# Patient Record
Sex: MALE | Race: WHITE | URBAN - METROPOLITAN AREA
[De-identification: names, ages, dates, MRNs, and addresses within clinical notes are randomized per-mention and may not be internally consistent; named-entity substitution may affect disease eponyms.]

---

## 2023-03-13 ENCOUNTER — APPOINTMENT (RX ONLY)
Dept: URBAN - METROPOLITAN AREA CLINIC 335 | Facility: CLINIC | Age: 73
Setting detail: DERMATOLOGY
End: 2023-03-13

## 2023-03-13 DIAGNOSIS — L82.1 OTHER SEBORRHEIC KERATOSIS: ICD-10-CM | Status: INADEQUATELY CONTROLLED

## 2023-03-13 PROBLEM — D48.5 NEOPLASM OF UNCERTAIN BEHAVIOR OF SKIN: Status: ACTIVE | Noted: 2023-03-13

## 2023-03-13 PROCEDURE — ? COUNSELING

## 2023-03-13 PROCEDURE — 99202 OFFICE O/P NEW SF 15 MIN: CPT | Mod: 25

## 2023-03-13 PROCEDURE — 11104 PUNCH BX SKIN SINGLE LESION: CPT

## 2023-03-13 PROCEDURE — ? BIOPSY BY PUNCH METHOD

## 2023-03-13 ASSESSMENT — LOCATION SIMPLE DESCRIPTION DERM: LOCATION SIMPLE: LEFT PRETIBIAL REGION

## 2023-03-13 ASSESSMENT — LOCATION DETAILED DESCRIPTION DERM: LOCATION DETAILED: LEFT MEDIAL PROXIMAL PRETIBIAL REGION

## 2023-03-13 ASSESSMENT — LOCATION ZONE DERM: LOCATION ZONE: LEG

## 2023-03-27 ENCOUNTER — APPOINTMENT (RX ONLY)
Dept: URBAN - METROPOLITAN AREA CLINIC 335 | Facility: CLINIC | Age: 73
Setting detail: DERMATOLOGY
End: 2023-03-27

## 2023-03-27 PROBLEM — C44.319 BASAL CELL CARCINOMA OF SKIN OF OTHER PARTS OF FACE: Status: ACTIVE | Noted: 2023-03-27

## 2023-03-27 PROCEDURE — ? PATIENT EDUCATION

## 2023-03-27 PROCEDURE — 99212 OFFICE O/P EST SF 10 MIN: CPT

## 2023-03-27 PROCEDURE — ? COUNSELING

## 2023-03-27 PROCEDURE — ? SURGICAL DECISION MAKING

## 2023-03-27 PROCEDURE — ? PATHOLOGY DISCUSSION

## 2023-03-27 NOTE — PROCEDURE: PATIENT EDUCATION
Mohs Counseling: Mohs Pre-operative Information\\n\\nThe providers at [facility] have recommended that you have Mohs Micrographic Surgery to treat your skin cancer. Mohs is a surgical procedure that is the most effective technique available for treating skin cancers. Performed in an outpatient setting, Mohs surgery entirely removes skin cancer while sparing the greatest possible amount of surrounding healthy skin.\\n\\Gm order to facilitate your healing, and a comfortable operative experience, please consider these recommendations:\\n\\nBLOOD THINNING MEDICATIONS:\\n\\nAvoid medications that prolong bleeding unless they are being specifically used for their blood thinning properties as recommended by your physician.\\n\\nSome medications or supplements that thin the blood that you may not know about are:\\n\\n* Aspirin (Ecotrin, Ascriptin, Fiorinal), Cafergot\\n\\n* Ibuprofen (Motrin, Advil, Nuprin)\\n\\n* Naproxen (Naprosyn, Aleve)\\n\\n* Fish oil\\n\\n* Vitamin E\\n\\n* Flaxseed oil\\n\\nIf you can use Tylenol for a headache instead of Aspirin, please do so. However, if your medical doctor has advised an Aspirin a day for its ability to prevent clots because you have a higher risk of coronary disease or stroke, please continue to take it.\\n\\nIf your doctor has prescribed other blood thinning medications to decrease your risk of stroke or blood clots, such as Coumadin (warfarin), Xarelto (rivaroxaban), Pradaxa (dabigatran), Eliquis (apixiban), and Plavix (clopidogrel), please continue to take these. We can work around the bleeding and the benefits outweigh the nuisance of extra bleeding during surgery.\\n\\nARTIFICIAL HEART VALVES AND ARTIFICIAL JOINTS:\\n\\nTo help our providers decide if you need antibiotics before your surgery, please let your provider know if:\\n\\n* You have valvular heart disease, or have had a heart valve replacement, and generally require antibiotics before surgery or dental procedures\\n\\n* You have had hip, knee, or other joint replacements within the past two years, or if your doctor has recommended that you take antibiotics before surgery or dental procedures\\n\\nON THE DAY OF YOUR PROCEDURE:\\n\\n**Please take all your normally prescribed medications as directed by your physicians.\\n\\n**Please be sure to eat your normal breakfast and/or lunch before coming in for surgery so that you are feeling well.\\n\\n*You may bring one  along.\\n\\n*Our Mohs suites tend to be a little chilly; wearing warm clothing is ideal.\\n\\nPRIOR TO YOUR SURGERY:\\n\\n** Before your surgery, your referring provider may prescribe you antibiotics to take and or apply post the surgery. Please take 1 hour prior to surgery time.\\n\\nWHAT TO EXPECT ON THE DAY OF YOUR PROCEDURE:\\n\\nThe affected area will first be anesthetized (numbed) with local anesthesia. Dr. Walker will then remove a thin layer of the cancerous tissue. This part of the procedure takes 5-10 minutes.\\n\\nThe tissue is then specially processed, stained with dyes, and placed on microscope slides for Dr. Walker to review. This part of the process can take as little as 30 minutes, or as long as 2 hours, depending on the specific nature of the tissue removed and the type of cancer being treated.\\n\\nIf Dr. Walker sees that there are any roots or tails of the cancer left under the microscope, he will come back and remove more tissue from just those areas, and the process is repeated in stages until all the cancer is removed.\\n\\nMohs surgery is most often completed in one office visit. Most patients experience little discomfort during and after the Mohs procedure.\\n\\nFollowing the Mohs surgery procedure, Dr. Walker will repair the majority of wounds with sutures at that time, or by allowing the wound to heal naturally. Occasionally, the involvement of another specialist may be needed to assist in the reconstruction of your wound. Dr. Walker and his team will discuss this with you if it is needed.\\n\\nThe Mohs surgery procedure is done in stages, and there is no reliable way to tell in advance how many stages your cancer will need in order to be removed. It is therefore best to plan on being at our office anywhere from 2 hours to all day on the day of your procedure. You may bring lunch if you wish. Our [facility] office is located at [address] [place] [address]. If you have any additional questions, please feel free to contact our office at [phone]. We look forward to serving you soon! Mohs Counseling: Mohs Pre-operative Information\\n\\nThe providers at [facility] have recommended that you have Mohs Micrographic Surgery to treat your skin cancer. Mohs is a surgical procedure that is the most effective technique available for treating skin cancers. Performed in an outpatient setting, Mohs surgery entirely removes skin cancer while sparing the greatest possible amount of surrounding healthy skin.\\n\\Gm order to facilitate your healing, and a comfortable operative experience, please consider these recommendations:\\n\\nBLOOD THINNING MEDICATIONS:\\n\\nAvoid medications that prolong bleeding unless they are being specifically used for their blood thinning properties as recommended by your physician.\\n\\nSome medications or supplements that thin the blood that you may not know about are:\\n\\n* Aspirin (Ecotrin, Ascriptin, Fiorinal), Cafergot\\n\\n* Ibuprofen (Motrin, Advil, Nuprin)\\n\\n* Naproxen (Naprosyn, Aleve)\\n\\n* Fish oil\\n\\n* Vitamin E\\n\\n* Flaxseed oil\\n\\nIf you can use Tylenol for a headache instead of Aspirin, please do so. However, if your medical doctor has advised an Aspirin a day for its ability to prevent clots because you have a higher risk of coronary disease or stroke, please continue to take it.\\n\\nIf your doctor has prescribed other blood thinning medications to decrease your risk of stroke or blood clots, such as Coumadin (warfarin), Xarelto (rivaroxaban), Pradaxa (dabigatran), Eliquis (apixiban), and Plavix (clopidogrel), please continue to take these. We can work around the bleeding and the benefits outweigh the nuisance of extra bleeding during surgery.\\n\\nARTIFICIAL HEART VALVES AND ARTIFICIAL JOINTS:\\n\\nTo help our providers decide if you need antibiotics before your surgery, please let your provider know if:\\n\\n* You have valvular heart disease, or have had a heart valve replacement, and generally require antibiotics before surgery or dental procedures\\n\\n* You have had hip, knee, or other joint replacements within the past two years, or if your doctor has recommended that you take antibiotics before surgery or dental procedures\\n\\nON THE DAY OF YOUR PROCEDURE:\\n\\n**Please take all your normally prescribed medications as directed by your physicians.\\n\\n**Please be sure to eat your normal breakfast and/or lunch before coming in for surgery so that you are feeling well.\\n\\n*You may bring one  along.\\n\\n*Our Mohs suites tend to be a little chilly; wearing warm clothing is ideal.\\n\\nPRIOR TO YOUR SURGERY:\\n\\n** Before your surgery, your referring provider may prescribe you antibiotics to take and or apply post the surgery. Please take 1 hour prior to surgery time.\\n\\nWHAT TO EXPECT ON THE DAY OF YOUR PROCEDURE:\\n\\nThe affected area will first be anesthetized (numbed) with local anesthesia. Dr. Walker will then remove a thin layer of the cancerous tissue. This part of the procedure takes 5-10 minutes.\\n\\nThe tissue is then specially processed, stained with dyes, and placed on microscope slides for Dr. Walker to review. This part of the process can take as little as 30 minutes, or as long as 2 hours, depending on the specific nature of the tissue removed and the type of cancer being treated.\\n\\nIf Dr. Walker sees that there are any roots or tails of the cancer left under the microscope, he will come back and remove more tissue from just those areas, and the process is repeated in stages until all the cancer is removed.\\n\\nMohs surgery is most often completed in one office visit. Most patients experience little discomfort during and after the Mohs procedure.\\n\\nFollowing the Mohs surgery procedure, Dr. Walker will repair the majority of wounds with sutures at that time, or by allowing the wound to heal naturally. Occasionally, the involvement of another specialist may be needed to assist in the reconstruction of your wound. Dr. Walker and his team will discuss this with you if it is needed.\\n\\nThe Mohs surgery procedure is done in stages, and there is no reliable way to tell in advance how many stages your cancer will need in order to be removed. It is therefore best to plan on being at our office anywhere from 2 hours to all day on the day of your procedure. You may bring lunch if you wish. Our [facility] office is located at [address] [place] [address]. If you have any additional questions, please feel free to contact our office at [phone]. We look forward to serving you soon!

## 2023-03-27 NOTE — PROCEDURE: PATIENT EDUCATION
[facility]erm Counseling: Filler Information \\n\\nCosmetic Filler Information\\nFillers are a smooth gel consisting of hyaluronic acid.  Hyaluronic acid is a substance that naturally occurs in our bodies, unlike collagen, no pretesting is needed with these products.  The provider eases the injectable under the patient's skin to instantly smooth out wrinkles or folds.  The filler our providers inject is Juv?derm Ultra,  and Juv?derm Voluma.\\n\\n\\nCosmetic Filler Consultation\\nPatient cannot have a history of any autoimmune conditions such as lupus.\\nPatient cannot be pregnant or breastfeeding.\\nSide effects include bruising, swelling, and pain at site (all side effects are temporary and generally last around 7 days). \\nThe area will be numbed with Betacaine or lidocaine.\\nTreatment generally takes 1-2 full vials per area. \\nAreas treated are nasal labial folds, around the mouth, lips, marionette lines, and hands.\\nPatient may consider Botox in addition to Filler.\\nFillers and Botox treatment can be done at the same time.\\nIf bruising is a concern, Arnica is a supplement that minimizes bruising.  This supplement can be taken once daily for 1 week prior to your filler appointment to prevent bruising.  It can also be taken starting on the same day as the procedure to minimize bruising and quicken the healing process if bruises do appear.  If starting on the same day as the procedure please follow instructions on the package. \\n\\nTo help your filler last longer we recommend using a product called HA 5 twice a day. It stimulates our body's  natural production of Hyaluronic Acid which is the main ingredient in your filler. HA5 can be used under your moisturizer. For your convenience this product is available for purchase in our office. Please ask our staff for more details. \\nPlease call us at [phone] with any questions or concerns.\\n\\n**Become a [employer] Member of [facility] and receive great discounts on the cosmetic services you already enjoy! Please ask our staff for more details.**\\n**Ask our staff how you can  enhance your Cosmetic Filler. Juvederm Counseling: Filler Information \\n\\nCosmetic Filler Information\\nFillers are a smooth gel consisting of hyaluronic acid.  Hyaluronic acid is a substance that naturally occurs in our bodies, unlike collagen, no pretesting is needed with these products.  The provider eases the injectable under the patient's skin to instantly smooth out wrinkles or folds.  The filler our providers inject is Juv?derm Ultra,  and Juv?derm Voluma.\\n\\n\\nCosmetic Filler Consultation\\nPatient cannot have a history of any autoimmune conditions such as lupus.\\nPatient cannot be pregnant or breastfeeding.\\nSide effects include bruising, swelling, and pain at site (all side effects are temporary and generally last around 7 days). \\nThe area will be numbed with Betacaine or lidocaine.\\nTreatment generally takes 1-2 full vials per area. \\nAreas treated are nasal labial folds, around the mouth, lips, marionette lines, and hands.\\nPatient may consider Botox in addition to Filler.\\nFillers and Botox treatment can be done at the same time.\\nIf bruising is a concern, Arnica is a supplement that minimizes bruising.  This supplement can be taken once daily for 1 week prior to your filler appointment to prevent bruising.  It can also be taken starting on the same day as the procedure to minimize bruising and quicken the healing process if bruises do appear.  If starting on the same day as the procedure please follow instructions on the package. \\n\\nTo help your filler last longer we recommend using a product called HA 5 twice a day. It stimulates our body's  natural production of Hyaluronic Acid which is the main ingredient in your filler. HA5 can be used under your moisturizer. For your convenience this product is available for purchase in our office. Please ask our staff for more details. \\nPlease call us at [phone] with any questions or concerns.\\n\\n**Become a [employer] Member of [facility] and receive great discounts on the cosmetic services you already enjoy! Please ask our staff for more details.**\\n**Ask our staff how you can  enhance your Cosmetic Filler.

## 2023-03-27 NOTE — PROCEDURE: PATIENT EDUCATION
Mohs Counseling: MOHS/ SURGERY POST-OPERATIVE INSTRUCTIONS\\n\\nWOUND CARE\\n\\nKeep the bandage dry until 24 hours after surgery. Thereafter, change the bandage twice a day until sutures are removed. You may soak the bandage to help it peel off. Gently clean the wound with a mild soap and water, and then gently pat the wound dry. Gently apply a thick layer of Vaseline to the wound, or Mupirocin if it has been prescribed to you. Cover the wound with a Band-Aid or non-stick gauze (e.g. Telfa?), and paper tape. Repeat this process daily until sutures are removed.\\n\\nWe do not recommend using over-the-counter antibiotic ointment given the high chance of developing allergic reactions in covered surgical wounds. Do not apply alcohol or hydrogen peroxide directly to the wound.\\n\\nFor hands, wrists, and forearms:\\n\\nAfter surgery, you will be in a bulky dressing (bandage) with a velcro splint that goes from the hand to the middle of the forearm, with the fingers free. The splint is similar to a cast, however; the purpose of this splint is to decrease the mobility of your hand to prevent over working incision site. The splint protects the incision and the surgical repair, as well as lessen swelling. The splint can be removed and must be kept dry. When showering or bathing, remove bandage and the splint and refer to post op instructions for wound care. Elevate your hand above your heart as much as possible to lessen swelling and pain. Pillows and blankets under the arm are helpful when you go to sleep.\\n\\nBLEEDING\\n\\nIt is fairly common for the incision to ooze or bleed, especially in the first several hours after surgery. This can be controlled by removing the bandage we applied and then applying constant, direct pressure to the bleeding site with a clean bandage or paper towel for 20 minutes (without peeking). If the bleeding continues, repeat the above procedure for an additional 20 minutes. If the incision continues to bleed after this time, call the office at [phone].\\n\\nDISCOMFORT\\n\\nIf you have discomfort following surgery, take two Extra Strength Tylenol? (total of 1,000 mg) every 6 hours OR a prescription pain medication if one has been prescribed to you. If this is not sufficient to control the pain, please call the office. AVOID medications that contain aspirin, ibuprofen, or naproxyn (e.g. Alleve?, Excedrin?, Bufferin?), as these products increase the risk of bleeding.\\n\\nSWELLING\\n\\nLocal swelling is common after surgery. Apply an ice pack over the dressing ? on for 20 minutes and off for 1 hour.\\n\\nRepeat until bedtime. You may continue this, if necessary, as long as the swelling persists. This will help with swelling, pain, and bleeding.\\n\\nACTIVITY\\n\\nPlease refrain from any strenuous physical activity until your sutures have been removed. This includes lifting weights, going to the gym, or doing any type of stretching in the area the surgery was done. Any of these activities could cause your sutures to break and open your wound.\\n\\nALCOHOL\\n\\nAvoid drinking alcohol for 48 hours following surgery, as alcohol increases the risk of bleeding.\\n\\nSMOKING\\n\\nTo promote better healing and reduce the chance of complications, it is STRONGLY RECOMMENDED THAT YOU REFRAIN FROM SMOKING until the sutures are removed.\\n\\nSHOWERING\\n\\nUnless instructed otherwise, you may resume showering 24 hours after surgery.\\n\\nSUTURE REMOVAL\\n\\nWhen wounds are sutured, there are generally two layers of stitches placed. There are invisible stitches under the skin and visible ones above the skin. Stitches above the skin are removed in 1-2 weeks as instructed. Stitches under the skin absorb on their own in 8 weeks to 6 months depending on the type of material used. Occasionally, one of the stitches under the skin may work itself through the skin before being absorbed. If this occurs, call the office so we can remove this ?spitting? deep suture.\\n\\nFOLLOW-UP\\n\\nIt is imperative that you have routine follow-up with your dermatologist for skin checks. This should be arranged through your dermatologist?s office.\\n\\nIf Home Health was recommended for your wound, you may contact them within 24 hours if you have not heard from them contact [facility] [phone].\\n\\nIf you have any questions or concerns regarding your surgery, please call the office at [phone]. Mohs Counseling: MOHS/ SURGERY POST-OPERATIVE INSTRUCTIONS\\n\\nWOUND CARE\\n\\nKeep the bandage dry until 24 hours after surgery. Thereafter, change the bandage twice a day until sutures are removed. You may soak the bandage to help it peel off. Gently clean the wound with a mild soap and water, and then gently pat the wound dry. Gently apply a thick layer of Vaseline to the wound, or Mupirocin if it has been prescribed to you. Cover the wound with a Band-Aid or non-stick gauze (e.g. Telfa?), and paper tape. Repeat this process daily until sutures are removed.\\n\\nWe do not recommend using over-the-counter antibiotic ointment given the high chance of developing allergic reactions in covered surgical wounds. Do not apply alcohol or hydrogen peroxide directly to the wound.\\n\\nFor hands, wrists, and forearms:\\n\\nAfter surgery, you will be in a bulky dressing (bandage) with a velcro splint that goes from the hand to the middle of the forearm, with the fingers free. The splint is similar to a cast, however; the purpose of this splint is to decrease the mobility of your hand to prevent over working incision site. The splint protects the incision and the surgical repair, as well as lessen swelling. The splint can be removed and must be kept dry. When showering or bathing, remove bandage and the splint and refer to post op instructions for wound care. Elevate your hand above your heart as much as possible to lessen swelling and pain. Pillows and blankets under the arm are helpful when you go to sleep.\\n\\nBLEEDING\\n\\nIt is fairly common for the incision to ooze or bleed, especially in the first several hours after surgery. This can be controlled by removing the bandage we applied and then applying constant, direct pressure to the bleeding site with a clean bandage or paper towel for 20 minutes (without peeking). If the bleeding continues, repeat the above procedure for an additional 20 minutes. If the incision continues to bleed after this time, call the office at [phone].\\n\\nDISCOMFORT\\n\\nIf you have discomfort following surgery, take two Extra Strength Tylenol? (total of 1,000 mg) every 6 hours OR a prescription pain medication if one has been prescribed to you. If this is not sufficient to control the pain, please call the office. AVOID medications that contain aspirin, ibuprofen, or naproxyn (e.g. Alleve?, Excedrin?, Bufferin?), as these products increase the risk of bleeding.\\n\\nSWELLING\\n\\nLocal swelling is common after surgery. Apply an ice pack over the dressing ? on for 20 minutes and off for 1 hour.\\n\\nRepeat until bedtime. You may continue this, if necessary, as long as the swelling persists. This will help with swelling, pain, and bleeding.\\n\\nACTIVITY\\n\\nPlease refrain from any strenuous physical activity until your sutures have been removed. This includes lifting weights, going to the gym, or doing any type of stretching in the area the surgery was done. Any of these activities could cause your sutures to break and open your wound.\\n\\nALCOHOL\\n\\nAvoid drinking alcohol for 48 hours following surgery, as alcohol increases the risk of bleeding.\\n\\nSMOKING\\n\\nTo promote better healing and reduce the chance of complications, it is STRONGLY RECOMMENDED THAT YOU REFRAIN FROM SMOKING until the sutures are removed.\\n\\nSHOWERING\\n\\nUnless instructed otherwise, you may resume showering 24 hours after surgery.\\n\\nSUTURE REMOVAL\\n\\nWhen wounds are sutured, there are generally two layers of stitches placed. There are invisible stitches under the skin and visible ones above the skin. Stitches above the skin are removed in 1-2 weeks as instructed. Stitches under the skin absorb on their own in 8 weeks to 6 months depending on the type of material used. Occasionally, one of the stitches under the skin may work itself through the skin before being absorbed. If this occurs, call the office so we can remove this ?spitting? deep suture.\\n\\nFOLLOW-UP\\n\\nIt is imperative that you have routine follow-up with your dermatologist for skin checks. This should be arranged through your dermatologist?s office.\\n\\nIf Home Health was recommended for your wound, you may contact them within 24 hours if you have not heard from them contact [facility] [phone].\\n\\nIf you have any questions or concerns regarding your surgery, please call the office at [phone].

## 2023-03-27 NOTE — PROCEDURE: PATIENT EDUCATION
Botox Counseling: Botox Information \\n\\nBotox is a therapeutic muscle-relaxing agent consisting of a natural purified protein that is produced by the bacteria Clostridium Botulinum.  Botox is a quick and easy procedure done in the office and it takes approximately 10 minutes. This treatment relaxes the muscles and as a result the skin appears smoother, younger, relaxed, and rested.  Botox is also used for excessive sweating under the arms and hands.  It works by blocking the chemical receptors in nerves that stimulate sweat glands and isolates areas of excessive sweating.\\n\\nBotox Consultation\\n\\nPatient must not be pregnant or breastfeeding.\\nIt takes two weeks to get the full effect. We will have you back in two to three weeks for a free touch up if needed.\\nNumbing cream is used for minimal pain.\\nTreatment lasts three to six months on average.\\nCommonly treated areas are forehead, glabella, around the eyes, chest, neck, upper lip, around the lips, and axillae.\\nPatients must not have a history of seizures or neurological problems.\\nSide effects include bruising, drooping of eyelids (all side effects are temporary and would last around three weeks at the most).\\n\\nIf bruising is a concern, Arnica is a supplement that minimizes bruising.  This supplement can be taken once daily for 1 week prior to your Botox appointment to prevent bruising.  It can also be taken starting on the same day as the procedure to minimize bruising and quicken the healing process if bruises do appear.  If starting on the same day as the procedure please follow instructions on the package.  \\n\\n**Become a [employer] Member of [facility] and receive great discounts on the cosmetic services you already enjoy! Please ask our staff for more details.**\\n**Ask our staff how you can maxzimize your Botox Treatments.\\n\\nPlease call us at [phone] with questions or concerns.\\n \\n\\nPlease keep in mind that you have 3 weeks from your procedure day to schedule your free touch up from this procedure.  After that date, additional charges may apply. Botox Counseling: Botox Information \\n\\nBotox is a therapeutic muscle-relaxing agent consisting of a natural purified protein that is produced by the bacteria Clostridium Botulinum.  Botox is a quick and easy procedure done in the office and it takes approximately 10 minutes. This treatment relaxes the muscles and as a result the skin appears smoother, younger, relaxed, and rested.  Botox is also used for excessive sweating under the arms and hands.  It works by blocking the chemical receptors in nerves that stimulate sweat glands and isolates areas of excessive sweating.\\n\\nBotox Consultation\\n\\nPatient must not be pregnant or breastfeeding.\\nIt takes two weeks to get the full effect. We will have you back in two to three weeks for a free touch up if needed.\\nNumbing cream is used for minimal pain.\\nTreatment lasts three to six months on average.\\nCommonly treated areas are forehead, glabella, around the eyes, chest, neck, upper lip, around the lips, and axillae.\\nPatients must not have a history of seizures or neurological problems.\\nSide effects include bruising, drooping of eyelids (all side effects are temporary and would last around three weeks at the most).\\n\\nIf bruising is a concern, Arnica is a supplement that minimizes bruising.  This supplement can be taken once daily for 1 week prior to your Botox appointment to prevent bruising.  It can also be taken starting on the same day as the procedure to minimize bruising and quicken the healing process if bruises do appear.  If starting on the same day as the procedure please follow instructions on the package.  \\n\\n**Become a [employer] Member of [facility] and receive great discounts on the cosmetic services you already enjoy! Please ask our staff for more details.**\\n**Ask our staff how you can maxzimize your Botox Treatments.\\n\\nPlease call us at [phone] with questions or concerns.\\n \\n\\nPlease keep in mind that you have 3 weeks from your procedure day to schedule your free touch up from this procedure.  After that date, additional charges may apply.

## 2023-04-17 ENCOUNTER — APPOINTMENT (RX ONLY)
Dept: URBAN - METROPOLITAN AREA CLINIC 331 | Facility: CLINIC | Age: 73
Setting detail: DERMATOLOGY
End: 2023-04-17

## 2023-04-17 PROBLEM — C44.1192 BASAL CELL CARCINOMA OF SKIN OF LEFT LOWER EYELID, INCLUDING CANTHUS: Status: ACTIVE | Noted: 2023-04-17

## 2023-04-17 PROCEDURE — 17311 MOHS 1 STAGE H/N/HF/G: CPT

## 2023-04-17 PROCEDURE — ? PRESCRIPTION

## 2023-04-17 PROCEDURE — 13152 CMPLX RPR E/N/E/L 2.6-7.5 CM: CPT

## 2023-04-17 PROCEDURE — ? MOHS SURGERY

## 2023-04-17 RX ORDER — MUPIROCIN 20 MG/G
OINTMENT TOPICAL BID
Qty: 22 | Refills: 1 | Status: ERX | COMMUNITY
Start: 2023-04-17

## 2023-04-17 RX ORDER — MINOCYCLINE HYDROCHLORIDE 100 MG/1
CAPSULE ORAL BID
Qty: 14 | Refills: 0 | Status: ERX | COMMUNITY
Start: 2023-04-17

## 2023-04-17 RX ORDER — MINOCYCLINE HYDROCHLORIDE 100 MG/1
CAPSULE ORAL BID
Qty: 14 | Refills: 0 | Status: CANCELLED | COMMUNITY
Start: 2023-04-17

## 2023-04-17 RX ORDER — MUPIROCIN 20 MG/G
OINTMENT TOPICAL BID
Qty: 22 | Refills: 1 | Status: CANCELLED | COMMUNITY
Start: 2023-04-17

## 2023-04-17 RX ADMIN — MUPIROCIN: 20 OINTMENT TOPICAL at 00:00

## 2023-04-17 RX ADMIN — MINOCYCLINE HYDROCHLORIDE: 100 CAPSULE ORAL at 00:00

## 2023-04-17 NOTE — PROCEDURE: MOHS SURGERY
Post-Care Instructions: MOHS/ SURGERY POST-OPERATIVE INSTRUCTIONS\\n\\nWOUND CARE\\nKeep the bandage dry until 24 hours after surgery. Thereafter, change the bandage twice a day until sutures are removed. You may soak the bandage to help it peel off. Gently clean the wound with a Dial bar soap and water, and then gently pat the wound dry. Gently apply a thick layer of Vaseline to the wound, or Mupirocin if it has been prescribed to you. Cover the wound with a Band-Aid or non-stick gauze (e.g. Telfa?), and paper tape. Repeat this process daily until sutures are removed.\\n\\nWe do not recommend using over-the-counter antibiotic ointment given the high chance of developing allergic reactions in covered surgical wounds. Do not apply alcohol or hydrogen peroxide directly to the wound. \\n\\n \\n\\nFor hands, wrists, and forearms:\\nAfter surgery, you will be in a bulky dressing (bandage) with a velcro splint that goes from the hand to the middle of the forearm, with the fingers free. The splint is similar to a cast, however; the purpose of this splint is to decrease the mobility of your hand to prevent over working incision site. The splint protects the incision and the surgical repair, as well as lessen swelling. The splint can be removed and must be kept dry. When showering or bathing, remove bandage and the splint and refer to post op instructions for wound care.  Elevate your hand above your heart as much as possible to lessen swelling and pain. Pillows and blankets under the arm are helpful when you go to sleep.\\n\\n\\nBLEEDING\\nIt is fairly common for the incision to ooze or bleed, especially in the first several hours after surgery. This can be controlled by removing the bandage we applied and then applying constant, direct pressure to the bleeding site with a clean bandage or paper towel for 20 minutes (without peeking). If the bleeding continues, repeat the above procedure for an additional 20 minutes. If the incision continues to bleed after this time, call the office at [phone]. \\n\\nDISCOMFORT\\nIf you have discomfort following surgery, take two Extra Strength Tylenol? (total of 1,000 mg) every 6 hours OR a prescription pain medication if one has been prescribed to you. If this is not sufficient to control the pain, please call the office. AVOID medications that contain aspirin, ibuprofen, or naproxyn (e.g. Alleve?, Excedrin?, Bufferin?), as these products increase the risk of bleeding.\\n\\nSWELLING\\nLocal swelling is common after surgery. Apply an ice pack over the dressing ? on for 20 minutes and off for 1 hour. Repeat until bedtime. You may continue this, if necessary, as long as the swelling persists. This will help with swelling, pain, and bleeding.\\n\\nACTIVITY\\nPlease refrain from any strenuous physical activity until your sutures have been removed. This includes lifting weights, going to the gym, or doing any type of stretching in the area the surgery was done. Any of these activities could cause your sutures to break and open your wound.\\n\\nALCOHOL\\nAvoid drinking alcohol for 48 hours following surgery, as alcohol increases the risk of bleeding.\\n\\nSMOKING\\nTo promote better healing and reduce the chance of complications, it is STRONGLY RECOMMENDED THAT YOU REFRAIN FROM SMOKING until the sutures are removed.\\n\\nSHOWERING\\nUnless instructed otherwise, you may resume showering 24 hours after surgery. \\n\\nSUTURE REMOVAL\\nWhen wounds are sutured, there are generally two layers of stitches placed. There are invisible stitches under the skin and visible ones above the skin. Stitches above the skin are removed in 1-2 weeks as instructed. Stitches under the skin absorb on their own in 8 weeks to 6 months depending on the type of material used. Occasionally, one of the stitches under the skin may work itself through the skin before being absorbed. If this occurs, call the office so we can remove this ?spitting? deep suture.\\n\\nFOLLOW-UP\\nIt is imperative that you have routine follow-up with your dermatologist for skin checks. This should be arranged through your dermatologist?s office. \\n\\n\\nIf Home Health was recommended for your wound, you may contact them within 24 hours if you have not heard from them contact [facility] [phone].\\n\\n\\nIf you have any questions or concerns regarding your surgery, please call the office at \\[phone]. Post-Care Instructions: MOHS/ SURGERY POST-OPERATIVE INSTRUCTIONS\\n\\nWOUND CARE\\nKeep the bandage dry until 24 hours after surgery. Thereafter, change the bandage twice a day until sutures are removed. You may soak the bandage to help it peel off. Gently clean the wound with a Dial bar soap and water, and then gently pat the wound dry. Gently apply a thick layer of Vaseline to the wound, or Mupirocin if it has been prescribed to you. Cover the wound with a Band-Aid or non-stick gauze (e.g. Telfa?), and paper tape. Repeat this process daily until sutures are removed.\\n\\nWe do not recommend using over-the-counter antibiotic ointment given the high chance of developing allergic reactions in covered surgical wounds. Do not apply alcohol or hydrogen peroxide directly to the wound. \\n\\n \\n\\nFor hands, wrists, and forearms:\\nAfter surgery, you will be in a bulky dressing (bandage) with a velcro splint that goes from the hand to the middle of the forearm, with the fingers free. The splint is similar to a cast, however; the purpose of this splint is to decrease the mobility of your hand to prevent over working incision site. The splint protects the incision and the surgical repair, as well as lessen swelling. The splint can be removed and must be kept dry. When showering or bathing, remove bandage and the splint and refer to post op instructions for wound care.  Elevate your hand above your heart as much as possible to lessen swelling and pain. Pillows and blankets under the arm are helpful when you go to sleep.\\n\\n\\nBLEEDING\\nIt is fairly common for the incision to ooze or bleed, especially in the first several hours after surgery. This can be controlled by removing the bandage we applied and then applying constant, direct pressure to the bleeding site with a clean bandage or paper towel for 20 minutes (without peeking). If the bleeding continues, repeat the above procedure for an additional 20 minutes. If the incision continues to bleed after this time, call the office at [phone]. \\n\\nDISCOMFORT\\nIf you have discomfort following surgery, take two Extra Strength Tylenol? (total of 1,000 mg) every 6 hours OR a prescription pain medication if one has been prescribed to you. If this is not sufficient to control the pain, please call the office. AVOID medications that contain aspirin, ibuprofen, or naproxyn (e.g. Alleve?, Excedrin?, Bufferin?), as these products increase the risk of bleeding.\\n\\nSWELLING\\nLocal swelling is common after surgery. Apply an ice pack over the dressing ? on for 20 minutes and off for 1 hour. Repeat until bedtime. You may continue this, if necessary, as long as the swelling persists. This will help with swelling, pain, and bleeding.\\n\\nACTIVITY\\nPlease refrain from any strenuous physical activity until your sutures have been removed. This includes lifting weights, going to the gym, or doing any type of stretching in the area the surgery was done. Any of these activities could cause your sutures to break and open your wound.\\n\\nALCOHOL\\nAvoid drinking alcohol for 48 hours following surgery, as alcohol increases the risk of bleeding.\\n\\nSMOKING\\nTo promote better healing and reduce the chance of complications, it is STRONGLY RECOMMENDED THAT YOU REFRAIN FROM SMOKING until the sutures are removed.\\n\\nSHOWERING\\nUnless instructed otherwise, you may resume showering 24 hours after surgery. \\n\\nSUTURE REMOVAL\\nWhen wounds are sutured, there are generally two layers of stitches placed. There are invisible stitches under the skin and visible ones above the skin. Stitches above the skin are removed in 1-2 weeks as instructed. Stitches under the skin absorb on their own in 8 weeks to 6 months depending on the type of material used. Occasionally, one of the stitches under the skin may work itself through the skin before being absorbed. If this occurs, call the office so we can remove this ?spitting? deep suture.\\n\\nFOLLOW-UP\\nIt is imperative that you have routine follow-up with your dermatologist for skin checks. This should be arranged through your dermatologist?s office. \\n\\n\\nIf Home Health was recommended for your wound, you may contact them within 24 hours if you have not heard from them contact [facility] [phone].\\n\\n\\nIf you have any questions or concerns regarding your surgery, please call the office at [phone].

## 2023-04-17 NOTE — PROCEDURE: MOHS SURGERY

## 2023-04-17 NOTE — PROCEDURE: MOHS SURGERY
Body Location Override (Optional - Billing Will Still Be Based On Selected Body Map Location If Applicable): left lower eyelid (previously known as left superior central malar cheek)

## 2023-04-17 NOTE — PROCEDURE: MOHS SURGERY
Consent (Scalp)/Introductory Paragraph: The rationale for Mohs was explained to the patient and consent was obtained. The risks, benefits and alternatives to therapy were discussed in detail. Specifically, the risks of changes in hair growth pattern secondary to repair, infection, scarring, bleeding, prolonged wound healing, incomplete removal, allergy to anesthesia, nerve injury and recurrence were addressed. Prior to beginning the procedure, the surgical site was identified with the assistance of the patient and the medical record, including photographs and/or maps if available. The site was then clearly confirmed by the patient by direct visualization and/or the use of a hand mirror and a cotton-tipped applicator stick. All components of Universal Protocol/PAUSE Rule were completed: Prior to beginning the procedure, a pause was taken during which the surgeon, his assistant, and the patient verbally confirmed the patient's identification, the intended procedure, and the correct surgical site. Pause time: No indicators present

## 2023-04-24 ENCOUNTER — APPOINTMENT (RX ONLY)
Dept: URBAN - METROPOLITAN AREA CLINIC 331 | Facility: CLINIC | Age: 73
Setting detail: DERMATOLOGY
End: 2023-04-24

## 2023-04-24 DIAGNOSIS — Z48.817 ENCOUNTER FOR SURGICAL AFTERCARE FOLLOWING SURGERY ON THE SKIN AND SUBCUTANEOUS TISSUE: ICD-10-CM

## 2023-04-24 PROCEDURE — ? POST-OP WOUND CHECK

## 2023-04-24 PROCEDURE — 99024 POSTOP FOLLOW-UP VISIT: CPT

## 2023-04-24 ASSESSMENT — LOCATION SIMPLE DESCRIPTION DERM: LOCATION SIMPLE: LEFT EYELID

## 2023-04-24 ASSESSMENT — LOCATION ZONE DERM: LOCATION ZONE: EYELID

## 2023-04-24 ASSESSMENT — LOCATION DETAILED DESCRIPTION DERM: LOCATION DETAILED: LEFT LATERAL CANTHUS

## 2023-04-24 NOTE — PROCEDURE: POST-OP WOUND CHECK
Detail Level: Detailed
Add 08321 Cpt? (Important Note: In 2017 The Use Of 54522 Is Being Tracked By Cms To Determine Future Global Period Reimbursement For Global Periods): yes
Wound Evaluated By: Dr. Walker

## 2023-05-23 NOTE — PROCEDURE: SURGICAL DECISION MAKING
Discussion: MOHS
Risk Assessment Explanation (Does Not Render In The Note): Clinical determination of the probability and/or consequences of an event, such as surgery. Clinical assessment of the level of risk is affected by the nature of the event under consideration for the patient. Modifier 57 is used to indicate an Evaluation and Management (E/M) service resulted in the initial decision to perform surgery either the day before a major surgery (90 day global) or the day of a major surgery.
Initial Decision For Surgery?: No
[Negative] : Heme/Lymph
[de-identified] : sciatica
[de-identified] : dysphoria and anxiety
[de-identified] : but with history of being hypercoagulable for a period of time for unclear reasons